# Patient Record
Sex: MALE
[De-identification: names, ages, dates, MRNs, and addresses within clinical notes are randomized per-mention and may not be internally consistent; named-entity substitution may affect disease eponyms.]

---

## 2019-10-18 ENCOUNTER — APPOINTMENT (OUTPATIENT)
Dept: OTOLARYNGOLOGY | Facility: CLINIC | Age: 39
End: 2019-10-18
Payer: COMMERCIAL

## 2019-10-18 VITALS
WEIGHT: 150 LBS | BODY MASS INDEX: 21 KG/M2 | DIASTOLIC BLOOD PRESSURE: 70 MMHG | SYSTOLIC BLOOD PRESSURE: 127 MMHG | HEART RATE: 64 BPM | HEIGHT: 71 IN

## 2019-10-18 DIAGNOSIS — H93.293 OTHER ABNORMAL AUDITORY PERCEPTIONS, BILATERAL: ICD-10-CM

## 2019-10-18 DIAGNOSIS — R51 HEADACHE: ICD-10-CM

## 2019-10-18 DIAGNOSIS — J32.8 OTHER CHRONIC SINUSITIS: ICD-10-CM

## 2019-10-18 DIAGNOSIS — J32.0 CHRONIC MAXILLARY SINUSITIS: ICD-10-CM

## 2019-10-18 DIAGNOSIS — Z87.11 PERSONAL HISTORY OF PEPTIC ULCER DISEASE: ICD-10-CM

## 2019-10-18 PROBLEM — Z00.00 ENCOUNTER FOR PREVENTIVE HEALTH EXAMINATION: Status: ACTIVE | Noted: 2019-10-18

## 2019-10-18 PROCEDURE — 99213 OFFICE O/P EST LOW 20 MIN: CPT | Mod: 25

## 2019-10-18 PROCEDURE — 92567 TYMPANOMETRY: CPT

## 2019-10-18 PROCEDURE — 31231 NASAL ENDOSCOPY DX: CPT

## 2019-10-18 PROCEDURE — 92557 COMPREHENSIVE HEARING TEST: CPT

## 2019-10-18 RX ORDER — ESCITALOPRAM OXALATE 5 MG/1
TABLET, FILM COATED ORAL
Refills: 0 | Status: ACTIVE | COMMUNITY

## 2019-10-18 NOTE — ASSESSMENT
[FreeTextEntry1] : MANDEEP GASPAR has right sided facial pain and severe difficulty breathing thru his right nostril. I suggest CT of sinuses.\par \par He has mild hearing loss.

## 2019-10-18 NOTE — REVIEW OF SYSTEMS
[Post Nasal Drip] : post nasal drip [Nasal Congestion] : nasal congestion [Sinus Pain] : sinus pain [Sinus Pressure] : sinus pressure [Hoarseness] : hoarseness [Throat Dryness] : throat dryness [Heartburn] : heartburn [Patient Intake Form Reviewed] : Patient intake form was reviewed

## 2019-10-18 NOTE — HISTORY OF PRESENT ILLNESS
[de-identified] : MANDEEP GASPAR is a 39 year man with a history of several months lof ? fluid AU. He feels his hearing may be down. He gets occasional tinnitus.He also has several; months of right nasomaxillary pressure and he feels a mass. He has had rhinoplasty and septoplasty x 2.\par \par He has small pigmented cystic lesion of mid lower lip. He  is seeing Dr. ROLF Madsen dermatology for this.\par

## 2022-01-28 ENCOUNTER — APPOINTMENT (OUTPATIENT)
Dept: OTOLARYNGOLOGY | Facility: CLINIC | Age: 42
End: 2022-01-28
Payer: COMMERCIAL

## 2022-01-28 DIAGNOSIS — J01.00 ACUTE MAXILLARY SINUSITIS, UNSPECIFIED: ICD-10-CM

## 2022-01-28 PROCEDURE — 99213 OFFICE O/P EST LOW 20 MIN: CPT | Mod: 95

## 2022-01-28 RX ORDER — AMOXICILLIN AND CLAVULANATE POTASSIUM 875; 125 MG/1; MG/1
875-125 TABLET, COATED ORAL TWICE DAILY
Qty: 20 | Refills: 0 | Status: ACTIVE | COMMUNITY
Start: 2022-01-28 | End: 1900-01-01